# Patient Record
Sex: FEMALE | Race: WHITE | NOT HISPANIC OR LATINO | URBAN - METROPOLITAN AREA
[De-identification: names, ages, dates, MRNs, and addresses within clinical notes are randomized per-mention and may not be internally consistent; named-entity substitution may affect disease eponyms.]

---

## 2017-02-24 NOTE — PATIENT DISCUSSION
"(H40.033) Anatomical narrow angle, bilateral - Assesment : Examination revealed angle closure glaucoma OU. IOP stable today OU. Gonioscopy done today OU. Slit angles 360 degrees OU.  slit angles OU after dilation w/ Myd 0.5% - Plan : Discussed signs and symptoms of angle closure attack, and advised to not take medications that says ""not take if patient has glaucoma. "" Discussed option of LPI to reduce risks angle closure attacks

## 2017-02-24 NOTE — PATIENT DISCUSSION
(H43.811) Vitreous degeneration, right eye - Assesment : Examination revealed fresh PVD/vossius ring off the disc OD. No defects 360 degrees OU with wide field lens. - Plan : Monitor for changes. Advised patient to call our office with decreased vision or an increase in flashes and/or floaters. RV 1 year Exam, sooner if patient is considering LPI.

## 2017-07-25 ENCOUNTER — IMPORTED ENCOUNTER (OUTPATIENT)
Dept: URBAN - METROPOLITAN AREA CLINIC 50 | Facility: CLINIC | Age: 72
End: 2017-07-25

## 2017-08-16 ENCOUNTER — IMPORTED ENCOUNTER (OUTPATIENT)
Dept: URBAN - METROPOLITAN AREA CLINIC 50 | Facility: CLINIC | Age: 72
End: 2017-08-16

## 2017-09-29 ENCOUNTER — IMPORTED ENCOUNTER (OUTPATIENT)
Dept: URBAN - METROPOLITAN AREA CLINIC 50 | Facility: CLINIC | Age: 72
End: 2017-09-29

## 2017-09-29 NOTE — PATIENT DISCUSSION
"""Phaco with IOL OS: 10/05/2017 [facility] ZCB00 24.0 (ORA) Target: Distance

## 2017-10-05 ENCOUNTER — IMPORTED ENCOUNTER (OUTPATIENT)
Dept: URBAN - METROPOLITAN AREA CLINIC 50 | Facility: CLINIC | Age: 72
End: 2017-10-05

## 2017-10-05 NOTE — PATIENT DISCUSSION
"""S/P IOL OS: Tecnis ZCB00 24.0 (ORA) (Target: Distance) +ORA/LenSx/Arcs +TM/Omidria. Continue post operative instructions and drops per schedule.  """

## 2017-10-06 ENCOUNTER — IMPORTED ENCOUNTER (OUTPATIENT)
Dept: URBAN - METROPOLITAN AREA CLINIC 50 | Facility: CLINIC | Age: 72
End: 2017-10-06

## 2017-10-10 ENCOUNTER — IMPORTED ENCOUNTER (OUTPATIENT)
Dept: URBAN - METROPOLITAN AREA CLINIC 50 | Facility: CLINIC | Age: 72
End: 2017-10-10

## 2017-10-19 ENCOUNTER — IMPORTED ENCOUNTER (OUTPATIENT)
Dept: URBAN - METROPOLITAN AREA CLINIC 50 | Facility: CLINIC | Age: 72
End: 2017-10-19

## 2017-10-19 NOTE — PATIENT DISCUSSION
"""S/P IOL OD: Tecnis ZCB00 24.0 (ORA) (Target: May) +ORA/LenSx/Arcs +TM/Omidria. Continue post operative instructions and drops per schedule.  """

## 2017-10-31 ENCOUNTER — IMPORTED ENCOUNTER (OUTPATIENT)
Dept: URBAN - METROPOLITAN AREA CLINIC 50 | Facility: CLINIC | Age: 72
End: 2017-10-31

## 2017-11-14 ENCOUNTER — IMPORTED ENCOUNTER (OUTPATIENT)
Dept: URBAN - METROPOLITAN AREA CLINIC 50 | Facility: CLINIC | Age: 72
End: 2017-11-14

## 2017-11-21 ENCOUNTER — IMPORTED ENCOUNTER (OUTPATIENT)
Dept: URBAN - METROPOLITAN AREA CLINIC 50 | Facility: CLINIC | Age: 72
End: 2017-11-21

## 2017-12-21 ENCOUNTER — IMPORTED ENCOUNTER (OUTPATIENT)
Dept: URBAN - METROPOLITAN AREA CLINIC 50 | Facility: CLINIC | Age: 72
End: 2017-12-21

## 2018-01-04 ENCOUNTER — IMPORTED ENCOUNTER (OUTPATIENT)
Dept: URBAN - METROPOLITAN AREA CLINIC 50 | Facility: CLINIC | Age: 73
End: 2018-01-04

## 2018-02-13 ENCOUNTER — IMPORTED ENCOUNTER (OUTPATIENT)
Dept: URBAN - METROPOLITAN AREA CLINIC 50 | Facility: CLINIC | Age: 73
End: 2018-02-13

## 2018-08-14 ENCOUNTER — IMPORTED ENCOUNTER (OUTPATIENT)
Dept: URBAN - METROPOLITAN AREA CLINIC 50 | Facility: CLINIC | Age: 73
End: 2018-08-14

## 2018-11-08 ENCOUNTER — IMPORTED ENCOUNTER (OUTPATIENT)
Dept: URBAN - METROPOLITAN AREA CLINIC 50 | Facility: CLINIC | Age: 73
End: 2018-11-08

## 2018-11-20 ENCOUNTER — IMPORTED ENCOUNTER (OUTPATIENT)
Dept: URBAN - METROPOLITAN AREA CLINIC 50 | Facility: CLINIC | Age: 73
End: 2018-11-20

## 2019-02-12 ENCOUNTER — IMPORTED ENCOUNTER (OUTPATIENT)
Dept: URBAN - METROPOLITAN AREA CLINIC 50 | Facility: CLINIC | Age: 74
End: 2019-02-12

## 2019-08-20 ENCOUNTER — IMPORTED ENCOUNTER (OUTPATIENT)
Dept: URBAN - METROPOLITAN AREA CLINIC 50 | Facility: CLINIC | Age: 74
End: 2019-08-20

## 2021-04-17 ASSESSMENT — TONOMETRY
OD_IOP_MMHG: 12
OD_IOP_MMHG: 12
OS_IOP_MMHG: 16
OS_IOP_MMHG: 12
OS_IOP_MMHG: 11
OD_IOP_MMHG: 12
OS_IOP_MMHG: 16
OD_IOP_MMHG: 16
OD_IOP_MMHG: 12
OD_IOP_MMHG: 14
OS_IOP_MMHG: 12
OS_IOP_MMHG: 13
OD_IOP_MMHG: 10
OD_IOP_MMHG: 25
OS_IOP_MMHG: 12
OD_IOP_MMHG: 12
OS_IOP_MMHG: 17
OS_IOP_MMHG: 14
OS_IOP_MMHG: 11
OS_IOP_MMHG: 14
OD_IOP_MMHG: 18
OD_IOP_MMHG: 12
OD_IOP_MMHG: 12
OS_IOP_MMHG: 10
OD_IOP_MMHG: 14
OS_IOP_MMHG: 14
OD_IOP_MMHG: 14
OS_IOP_MMHG: 12

## 2021-04-17 ASSESSMENT — PACHYMETRY
OS_CT_UM: 502
OD_CT_UM: 504
OS_CT_UM: 502
OD_CT_UM: 504
OD_CT_UM: 504
OS_CT_UM: 502
OD_CT_UM: 504
OD_CT_UM: 504
OS_CT_UM: 502

## 2021-04-17 ASSESSMENT — VISUAL ACUITY
OS_SC: 20/20
OD_SC: 20/20
OS_CC: J1+NEAR
OD_PH: 20/20-2
OS_SC: 20/20
OD_SC: 20/100-
OS_BAT: 20/80
OS_OTHER: 20/20. 20/20.
OD_CC: J1+@ 13 IN
OS_SC: 20/20-2
OD_OTHER: 20/25. 20/30.
OS_BAT: 20/80
OD_SC: 20/40-
OD_BAT: 20/70
OD_SC: 20/20
OD_SC: 20/200
OS_OTHER: 20/80. 20/80.
OD_OTHER: 20/25. 20/25.
OD_CC: J1+NEAR
OS_OTHER: 20/80. 20/200.
OD_OTHER: 20/70. 20/200.
OS_BAT: 20/20
OS_CC: 20/50
OD_BAT: 20/25
OS_OTHER: 20/80. 20/200.
OD_SC: 20/25-2
OD_BAT: 20/70
OD_SC: 20/20-1
OD_CC: J2
OD_OTHER: 20/70. 20/200.
OS_SC: 20/20
OS_OTHER: 20/20. 20/20.
OD_SC: 20/25-2
OD_BAT: 20/70
OS_CC: J1+@ 13 IN
OS_BAT: 20/20
OS_CC: J1+
OS_SC: 20/200
OS_SC: 20/20
OD_OTHER: 20/70. 20/200.
OS_CC: 20/40+1
OS_CC: 20/20
OS_BAT: 20/80
OD_BAT: 20/25
OS_SC: 20/25
OD_SC: 20/25
OS_SC: 20/20
OD_CC: J1+
OS_CC: J2
OD_CC: 20/20
OD_CC: 20/40